# Patient Record
Sex: MALE | Race: WHITE | URBAN - METROPOLITAN AREA
[De-identification: names, ages, dates, MRNs, and addresses within clinical notes are randomized per-mention and may not be internally consistent; named-entity substitution may affect disease eponyms.]

---

## 2022-01-01 ENCOUNTER — HOSPITAL ENCOUNTER (OUTPATIENT)
Age: 72
End: 2022-06-21
Attending: INTERNAL MEDICINE | Admitting: INTERNAL MEDICINE
Payer: COMMERCIAL

## 2022-01-01 ENCOUNTER — APPOINTMENT (OUTPATIENT)
Dept: CT IMAGING | Age: 72
End: 2022-01-01
Attending: INTERNAL MEDICINE
Payer: COMMERCIAL

## 2022-01-01 ENCOUNTER — APPOINTMENT (OUTPATIENT)
Dept: GENERAL RADIOLOGY | Age: 72
End: 2022-01-01
Attending: INTERNAL MEDICINE
Payer: COMMERCIAL

## 2022-01-01 ENCOUNTER — APPOINTMENT (OUTPATIENT)
Dept: MRI IMAGING | Age: 72
End: 2022-01-01
Attending: INTERNAL MEDICINE
Payer: COMMERCIAL

## 2022-01-01 ENCOUNTER — APPOINTMENT (OUTPATIENT)
Dept: NON INVASIVE DIAGNOSTICS | Age: 72
End: 2022-01-01
Attending: INTERNAL MEDICINE
Payer: COMMERCIAL

## 2022-01-01 ENCOUNTER — HOSPITAL ENCOUNTER (EMERGENCY)
Age: 72
Discharge: HOME | End: 2022-06-14
Attending: EMERGENCY MEDICINE
Payer: COMMERCIAL

## 2022-01-01 VITALS
SYSTOLIC BLOOD PRESSURE: 124 MMHG | HEART RATE: 98 BPM | RESPIRATION RATE: 22 BRPM | DIASTOLIC BLOOD PRESSURE: 95 MMHG | OXYGEN SATURATION: 87 % | TEMPERATURE: 98 F

## 2022-01-01 VITALS
SYSTOLIC BLOOD PRESSURE: 125 MMHG | TEMPERATURE: 98.5 F | WEIGHT: 211.64 LBS | HEART RATE: 97 BPM | BODY MASS INDEX: 29.63 KG/M2 | OXYGEN SATURATION: 96 % | DIASTOLIC BLOOD PRESSURE: 79 MMHG | HEIGHT: 71 IN | RESPIRATION RATE: 17 BRPM

## 2022-01-01 DIAGNOSIS — G37.3 TRANSVERSE MYELITIS (HCC): ICD-10-CM

## 2022-01-01 DIAGNOSIS — Z92.89 HISTORY OF PLASMAPHERESIS: Primary | ICD-10-CM

## 2022-01-01 LAB
25(OH)D3 SERPL-MCNC: 19.8 NG/ML (ref 30–100)
ABO + RH BLD: NORMAL
ALBUMIN SERPL-MCNC: 2.3 G/DL (ref 3.2–4.6)
ALBUMIN SERPL-MCNC: 2.4 G/DL (ref 3.2–4.6)
ALBUMIN SERPL-MCNC: 2.5 G/DL (ref 3.2–4.6)
ALBUMIN SERPL-MCNC: 2.7 G/DL (ref 3.2–4.6)
ALBUMIN SERPL-MCNC: 2.7 G/DL (ref 3.2–4.6)
ALBUMIN SERPL-MCNC: 3.1 G/DL (ref 3.2–4.6)
ALBUMIN SERPL-MCNC: 3.3 G/DL (ref 3.2–4.6)
ALBUMIN SERPL-MCNC: 3.4 G/DL (ref 3.2–4.6)
ALBUMIN/GLOB SERPL: 0.7 {RATIO} (ref 1.2–3.5)
ALBUMIN/GLOB SERPL: 0.7 {RATIO} (ref 1.2–3.5)
ALBUMIN/GLOB SERPL: 0.8 {RATIO} (ref 1.2–3.5)
ALBUMIN/GLOB SERPL: 0.8 {RATIO} (ref 1.2–3.5)
ALBUMIN/GLOB SERPL: 0.9 {RATIO} (ref 1.2–3.5)
ALBUMIN/GLOB SERPL: 1.8 {RATIO} (ref 1.2–3.5)
ALBUMIN/GLOB SERPL: 2.1 {RATIO} (ref 1.2–3.5)
ALBUMIN/GLOB SERPL: 2.1 {RATIO} (ref 1.2–3.5)
ALBUMIN/GLOB SERPL: 2.3 {RATIO} (ref 1.2–3.5)
ALBUMIN/GLOB SERPL: 3.4 {RATIO} (ref 1.2–3.5)
ALP SERPL-CCNC: 23 U/L (ref 50–136)
ALP SERPL-CCNC: 30 U/L (ref 50–136)
ALP SERPL-CCNC: 33 U/L (ref 50–136)
ALP SERPL-CCNC: 41 U/L (ref 50–136)
ALP SERPL-CCNC: 49 U/L (ref 50–136)
ALP SERPL-CCNC: 75 U/L (ref 50–136)
ALP SERPL-CCNC: 75 U/L (ref 50–136)
ALP SERPL-CCNC: 77 U/L (ref 50–136)
ALP SERPL-CCNC: 78 U/L (ref 50–136)
ALP SERPL-CCNC: 84 U/L (ref 50–136)
ALT SERPL-CCNC: 169 U/L (ref 12–65)
ALT SERPL-CCNC: 40 U/L (ref 12–65)
ALT SERPL-CCNC: 42 U/L (ref 12–65)
ALT SERPL-CCNC: 47 U/L (ref 12–65)
ALT SERPL-CCNC: 50 U/L (ref 12–65)
ALT SERPL-CCNC: 50 U/L (ref 12–65)
ALT SERPL-CCNC: 58 U/L (ref 12–65)
ALT SERPL-CCNC: 64 U/L (ref 12–65)
ALT SERPL-CCNC: 69 U/L (ref 12–65)
ALT SERPL-CCNC: 72 U/L (ref 12–65)
AMMONIA PLAS-SCNC: 51 UMOL/L (ref 11–32)
ANION GAP SERPL CALC-SCNC: 5 MMOL/L (ref 7–16)
ANION GAP SERPL CALC-SCNC: 6 MMOL/L (ref 7–16)
ANION GAP SERPL CALC-SCNC: 7 MMOL/L (ref 7–16)
ANION GAP SERPL CALC-SCNC: 8 MMOL/L (ref 7–16)
APPEARANCE FLD: COLORLESS
APTT PPP: 25.5 SEC (ref 24.1–35.1)
APTT PPP: 26.3 SEC (ref 24.1–35.1)
APTT PPP: 27.4 SEC (ref 24.1–35.1)
APTT PPP: 27.6 SEC (ref 24.1–35.1)
APTT PPP: 31.3 SEC (ref 24.1–35.1)
APTT PPP: 35 SEC (ref 24.1–35.1)
APTT PPP: 39.2 SEC (ref 24.1–35.1)
APTT PPP: 44.6 SEC (ref 24.1–35.1)
AQP4 H2O CHANNEL IGG SERPL IA-ACNC: <1.5 U/ML (ref 0–3)
AST SERPL-CCNC: 12 U/L (ref 15–37)
AST SERPL-CCNC: 13 U/L (ref 15–37)
AST SERPL-CCNC: 16 U/L (ref 15–37)
AST SERPL-CCNC: 25 U/L (ref 15–37)
AST SERPL-CCNC: 25 U/L (ref 15–37)
AST SERPL-CCNC: 27 U/L (ref 15–37)
AST SERPL-CCNC: 29 U/L (ref 15–37)
AST SERPL-CCNC: 31 U/L (ref 15–37)
AST SERPL-CCNC: 34 U/L (ref 15–37)
AST SERPL-CCNC: 73 U/L (ref 15–37)
BACTERIA SPEC CULT: NORMAL
BASOPHILS # BLD: 0 K/UL (ref 0–0.2)
BASOPHILS NFR BLD: 0 % (ref 0–2)
BASOPHILS NFR BLD: 1 % (ref 0–2)
BILIRUB DIRECT SERPL-MCNC: 0.5 MG/DL
BILIRUB DIRECT SERPL-MCNC: 0.5 MG/DL
BILIRUB INDIRECT SERPL-MCNC: 0 MG/DL (ref 0–1.1)
BILIRUB INDIRECT SERPL-MCNC: 1.4 MG/DL (ref 0–1.1)
BILIRUB SERPL-MCNC: 0.4 MG/DL (ref 0.2–1.1)
BILIRUB SERPL-MCNC: 0.5 MG/DL (ref 0.2–1.1)
BILIRUB SERPL-MCNC: 0.7 MG/DL (ref 0.2–1.1)
BILIRUB SERPL-MCNC: 0.8 MG/DL (ref 0.2–1.1)
BILIRUB SERPL-MCNC: 0.8 MG/DL (ref 0.2–1.1)
BILIRUB SERPL-MCNC: 1.1 MG/DL (ref 0.2–1.1)
BILIRUB SERPL-MCNC: 1.2 MG/DL (ref 0.2–1.1)
BILIRUB SERPL-MCNC: 1.8 MG/DL (ref 0.2–1.1)
BILIRUB SERPL-MCNC: 1.9 MG/DL (ref 0.2–1.1)
BILIRUB SERPL-MCNC: 2 MG/DL (ref 0.2–1.1)
BLD PROD TYP BPU: NORMAL
BLOOD BANK DISPENSE STATUS: NORMAL
BLOOD GROUP ANTIBODIES SERPL: NORMAL
BPU ID: NORMAL
BUN SERPL-MCNC: 14 MG/DL (ref 8–23)
BUN SERPL-MCNC: 18 MG/DL (ref 8–23)
BUN SERPL-MCNC: 20 MG/DL (ref 8–23)
BUN SERPL-MCNC: 20 MG/DL (ref 8–23)
BUN SERPL-MCNC: 30 MG/DL (ref 8–23)
BUN SERPL-MCNC: 33 MG/DL (ref 8–23)
BUN SERPL-MCNC: 36 MG/DL (ref 8–23)
BUN SERPL-MCNC: 36 MG/DL (ref 8–23)
BUN SERPL-MCNC: 39 MG/DL (ref 8–23)
BUN SERPL-MCNC: 40 MG/DL (ref 8–23)
BUN SERPL-MCNC: 44 MG/DL (ref 8–23)
BUN SERPL-MCNC: 52 MG/DL (ref 8–23)
BUN SERPL-MCNC: 54 MG/DL (ref 8–23)
BUN SERPL-MCNC: 60 MG/DL (ref 8–23)
CA-I BLD-MCNC: 4.52 MG/DL (ref 4–5.2)
CALCIUM SERPL-MCNC: 7.5 MG/DL (ref 8.3–10.4)
CALCIUM SERPL-MCNC: 7.6 MG/DL (ref 8.3–10.4)
CALCIUM SERPL-MCNC: 7.8 MG/DL (ref 8.3–10.4)
CALCIUM SERPL-MCNC: 8 MG/DL (ref 8.3–10.4)
CALCIUM SERPL-MCNC: 8.3 MG/DL (ref 8.3–10.4)
CALCIUM SERPL-MCNC: 8.3 MG/DL (ref 8.3–10.4)
CALCIUM SERPL-MCNC: 8.4 MG/DL (ref 8.3–10.4)
CALCIUM SERPL-MCNC: 8.7 MG/DL (ref 8.3–10.4)
CALCIUM SERPL-MCNC: 8.8 MG/DL (ref 8.3–10.4)
CALCIUM SERPL-MCNC: 8.9 MG/DL (ref 8.3–10.4)
CALCIUM SERPL-MCNC: 8.9 MG/DL (ref 8.3–10.4)
CALCIUM SERPL-MCNC: 9.1 MG/DL (ref 8.3–10.4)
CALCIUM SERPL-MCNC: 9.2 MG/DL (ref 8.3–10.4)
CALCIUM SERPL-MCNC: 9.4 MG/DL (ref 8.3–10.4)
CHLORIDE SERPL-SCNC: 113 MMOL/L (ref 98–107)
CHLORIDE SERPL-SCNC: 113 MMOL/L (ref 98–107)
CHLORIDE SERPL-SCNC: 114 MMOL/L (ref 98–107)
CHLORIDE SERPL-SCNC: 115 MMOL/L (ref 98–107)
CHLORIDE SERPL-SCNC: 118 MMOL/L (ref 98–107)
CHLORIDE SERPL-SCNC: 118 MMOL/L (ref 98–107)
CHLORIDE SERPL-SCNC: 119 MMOL/L (ref 98–107)
CHLORIDE SERPL-SCNC: 120 MMOL/L (ref 98–107)
CHLORIDE SERPL-SCNC: 122 MMOL/L (ref 98–107)
CHLORIDE SERPL-SCNC: 123 MMOL/L (ref 98–107)
CO2 SERPL-SCNC: 18 MMOL/L (ref 21–32)
CO2 SERPL-SCNC: 20 MMOL/L (ref 21–32)
CO2 SERPL-SCNC: 20 MMOL/L (ref 21–32)
CO2 SERPL-SCNC: 21 MMOL/L (ref 21–32)
CO2 SERPL-SCNC: 23 MMOL/L (ref 21–32)
CO2 SERPL-SCNC: 24 MMOL/L (ref 21–32)
CO2 SERPL-SCNC: 25 MMOL/L (ref 21–32)
CO2 SERPL-SCNC: 26 MMOL/L (ref 21–32)
CO2 SERPL-SCNC: 27 MMOL/L (ref 21–32)
CO2 SERPL-SCNC: 27 MMOL/L (ref 21–32)
CO2 SERPL-SCNC: 28 MMOL/L (ref 21–32)
COLOR FLD: CLEAR
CORTIS BS SERPL-MCNC: 17.9 UG/DL
CREAT SERPL-MCNC: 0.4 MG/DL (ref 0.8–1.5)
CREAT SERPL-MCNC: 0.5 MG/DL (ref 0.8–1.5)
CYTOLOGY-NON GYN: NORMAL
DIFFERENTIAL METHOD BLD: ABNORMAL
ECHO AO ASC DIAM: 3.7 CM
ECHO AO ASCENDING AORTA INDEX: 1.71 CM/M2
ECHO AO ROOT DIAM: 3.5 CM
ECHO AO ROOT INDEX: 1.62 CM/M2
ECHO AV AREA PEAK VELOCITY: 3.2 CM2
ECHO AV AREA VTI: 3.4 CM2
ECHO AV AREA/BSA PEAK VELOCITY: 1.5 CM2/M2
ECHO AV AREA/BSA VTI: 1.6 CM2/M2
ECHO AV MEAN GRADIENT: 3 MMHG
ECHO AV MEAN VELOCITY: 0.8 M/S
ECHO AV PEAK GRADIENT: 6 MMHG
ECHO AV PEAK VELOCITY: 1.3 M/S
ECHO AV VELOCITY RATIO: 0.85
ECHO AV VTI: 22.1 CM
ECHO BSA: 2.19 M2
ECHO EST RA PRESSURE: 8 MMHG
ECHO IVC PROX: 2.3 CM
ECHO LA AREA 2C: 17.4 CM2
ECHO LA AREA 4C: 18.2 CM2
ECHO LA DIAMETER INDEX: 1.81 CM/M2
ECHO LA DIAMETER: 3.9 CM
ECHO LA MAJOR AXIS: 5.2 CM
ECHO LA MINOR AXIS: 5.1 CM
ECHO LA TO AORTIC ROOT RATIO: 1.11
ECHO LA VOL BP: 45 ML (ref 18–58)
ECHO LA VOL/BSA BIPLANE: 21 ML/M2 (ref 16–34)
ECHO LV E' LATERAL VELOCITY: 9 CM/S
ECHO LV E' SEPTAL VELOCITY: 7 CM/S
ECHO LV EDV A2C: 125 ML
ECHO LV EDV A4C: 147 ML
ECHO LV EDV INDEX A4C: 68 ML/M2
ECHO LV EDV NDEX A2C: 58 ML/M2
ECHO LV EJECTION FRACTION A2C: 55 %
ECHO LV EJECTION FRACTION A4C: 53 %
ECHO LV EJECTION FRACTION BIPLANE: 54 % (ref 55–100)
ECHO LV ESV A2C: 57 ML
ECHO LV ESV A4C: 69 ML
ECHO LV ESV INDEX A2C: 26 ML/M2
ECHO LV ESV INDEX A4C: 32 ML/M2
ECHO LV FRACTIONAL SHORTENING: 27 % (ref 28–44)
ECHO LV INTERNAL DIMENSION DIASTOLE INDEX: 2.04 CM/M2
ECHO LV INTERNAL DIMENSION DIASTOLIC: 4.4 CM (ref 4.2–5.9)
ECHO LV INTERNAL DIMENSION SYSTOLIC INDEX: 1.48 CM/M2
ECHO LV INTERNAL DIMENSION SYSTOLIC: 3.2 CM
ECHO LV IVSD: 1 CM (ref 0.6–1)
ECHO LV MASS 2D: 147.8 G (ref 88–224)
ECHO LV MASS INDEX 2D: 68.4 G/M2 (ref 49–115)
ECHO LV POSTERIOR WALL DIASTOLIC: 1 CM (ref 0.6–1)
ECHO LV RELATIVE WALL THICKNESS RATIO: 0.45
ECHO LVOT AREA: 3.8 CM2
ECHO LVOT AV VTI INDEX: 0.88
ECHO LVOT DIAM: 2.2 CM
ECHO LVOT MEAN GRADIENT: 2 MMHG
ECHO LVOT PEAK GRADIENT: 4 MMHG
ECHO LVOT PEAK VELOCITY: 1.1 M/S
ECHO LVOT STROKE VOLUME INDEX: 34.3 ML/M2
ECHO LVOT SV: 74.1 ML
ECHO LVOT VTI: 19.5 CM
ECHO MV A VELOCITY: 0.86 M/S
ECHO MV E DECELERATION TIME (DT): 210 MS
ECHO MV E VELOCITY: 0.7 M/S
ECHO MV E/A RATIO: 0.81
ECHO MV E/E' LATERAL: 7.78
ECHO MV E/E' RATIO (AVERAGED): 8.89
ECHO MV E/E' SEPTAL: 10
ECHO RIGHT VENTRICULAR SYSTOLIC PRESSURE (RVSP): 32 MMHG
ECHO RV BASAL DIMENSION: 4.1 CM
ECHO RV TAPSE: 1.8 CM (ref 1.7–?)
ECHO TV REGURGITANT MAX VELOCITY: 2.43 M/S
ECHO TV REGURGITANT PEAK GRADIENT: 24 MMHG
EOSINOPHIL # BLD: 0 K/UL (ref 0–0.8)
EOSINOPHIL # BLD: 0.1 K/UL (ref 0–0.8)
EOSINOPHIL NFR BLD: 0 % (ref 0.5–7.8)
EOSINOPHIL NFR BLD: 2 % (ref 0.5–7.8)
EOSINOPHIL NFR BLD: 3 % (ref 0.5–7.8)
EOSINOPHIL NFR BLD: 4 % (ref 0.5–7.8)
ERYTHROCYTE [DISTWIDTH] IN BLOOD BY AUTOMATED COUNT: 15.3 % (ref 11.9–14.6)
ERYTHROCYTE [DISTWIDTH] IN BLOOD BY AUTOMATED COUNT: 15.5 % (ref 11.9–14.6)
ERYTHROCYTE [DISTWIDTH] IN BLOOD BY AUTOMATED COUNT: 15.8 % (ref 11.9–14.6)
ERYTHROCYTE [DISTWIDTH] IN BLOOD BY AUTOMATED COUNT: 15.8 % (ref 11.9–14.6)
ERYTHROCYTE [DISTWIDTH] IN BLOOD BY AUTOMATED COUNT: 15.9 % (ref 11.9–14.6)
ERYTHROCYTE [DISTWIDTH] IN BLOOD BY AUTOMATED COUNT: 16 % (ref 11.9–14.6)
ERYTHROCYTE [DISTWIDTH] IN BLOOD BY AUTOMATED COUNT: 16.2 % (ref 11.9–14.6)
ERYTHROCYTE [DISTWIDTH] IN BLOOD BY AUTOMATED COUNT: 16.5 % (ref 11.9–14.6)
ERYTHROCYTE [DISTWIDTH] IN BLOOD BY AUTOMATED COUNT: 16.9 % (ref 11.9–14.6)
ERYTHROCYTE [DISTWIDTH] IN BLOOD BY AUTOMATED COUNT: 17.6 % (ref 11.9–14.6)
ERYTHROCYTE [SEDIMENTATION RATE] IN BLOOD: 26 MM/HR
FERRITIN SERPL-MCNC: 673 NG/ML (ref 8–388)
FERRITIN SERPL-MCNC: 685 NG/ML (ref 8–388)
FIBRINOGEN PPP-MCNC: 135 MG/DL (ref 190–501)
FIBRINOGEN PPP-MCNC: 136 MG/DL (ref 190–501)
FIBRINOGEN PPP-MCNC: 145 MG/DL (ref 190–501)
FIBRINOGEN PPP-MCNC: 172 MG/DL (ref 190–501)
FIBRINOGEN PPP-MCNC: 344 MG/DL (ref 190–501)
FIBRINOGEN PPP-MCNC: 395 MG/DL (ref 190–501)
FIBRINOGEN PPP-MCNC: 62 MG/DL (ref 190–501)
FIBRINOGEN PPP-MCNC: 87 MG/DL (ref 190–501)
FLOW CYTOMETRY RESULTS: NORMAL
FOLATE SERPL-MCNC: 3.5 NG/ML (ref 3.1–17.5)
FOLATE SERPL-MCNC: 3.5 NG/ML (ref 3.1–17.5)
FOLATE SERPL-MCNC: 6.8 NG/ML (ref 3.1–17.5)
GLOBULIN SER CALC-MCNC: 1 G/DL (ref 2.3–3.5)
GLOBULIN SER CALC-MCNC: 1.2 G/DL (ref 2.3–3.5)
GLOBULIN SER CALC-MCNC: 1.3 G/DL (ref 2.3–3.5)
GLOBULIN SER CALC-MCNC: 1.6 G/DL (ref 2.3–3.5)
GLOBULIN SER CALC-MCNC: 1.7 G/DL (ref 2.3–3.5)
GLOBULIN SER CALC-MCNC: 2.8 G/DL (ref 2.3–3.5)
GLOBULIN SER CALC-MCNC: 3.1 G/DL (ref 2.3–3.5)
GLOBULIN SER CALC-MCNC: 3.2 G/DL (ref 2.3–3.5)
GLOBULIN SER CALC-MCNC: 3.3 G/DL (ref 2.3–3.5)
GLOBULIN SER CALC-MCNC: 3.3 G/DL (ref 2.3–3.5)
GLUCOSE CSF-MCNC: 34 MG/DL (ref 40–70)
GLUCOSE SERPL-MCNC: 107 MG/DL (ref 65–100)
GLUCOSE SERPL-MCNC: 140 MG/DL (ref 65–100)
GLUCOSE SERPL-MCNC: 141 MG/DL (ref 65–100)
GLUCOSE SERPL-MCNC: 142 MG/DL (ref 65–100)
GLUCOSE SERPL-MCNC: 150 MG/DL (ref 65–100)
GLUCOSE SERPL-MCNC: 152 MG/DL (ref 65–100)
GLUCOSE SERPL-MCNC: 157 MG/DL (ref 65–100)
GLUCOSE SERPL-MCNC: 158 MG/DL (ref 65–100)
GLUCOSE SERPL-MCNC: 160 MG/DL (ref 65–100)
GLUCOSE SERPL-MCNC: 160 MG/DL (ref 65–100)
GLUCOSE SERPL-MCNC: 166 MG/DL (ref 65–100)
GLUCOSE SERPL-MCNC: 88 MG/DL (ref 65–100)
GLUCOSE SERPL-MCNC: 91 MG/DL (ref 65–100)
GLUCOSE SERPL-MCNC: 91 MG/DL (ref 65–100)
GRAM STN SPEC: NORMAL
GRAM STN SPEC: NORMAL
HAPTOGLOB SERPL-MCNC: 127 MG/DL (ref 30–200)
HCT VFR BLD AUTO: 27.1 % (ref 41.1–50.3)
HCT VFR BLD AUTO: 30.8 % (ref 41.1–50.3)
HCT VFR BLD AUTO: 31.5 % (ref 41.1–50.3)
HCT VFR BLD AUTO: 32 % (ref 41.1–50.3)
HCT VFR BLD AUTO: 33.1 % (ref 41.1–50.3)
HCT VFR BLD AUTO: 33.9 % (ref 41.1–50.3)
HCT VFR BLD AUTO: 34.2 % (ref 41.1–50.3)
HCT VFR BLD AUTO: 34.3 % (ref 41.1–50.3)
HCT VFR BLD AUTO: 34.3 % (ref 41.1–50.3)
HCT VFR BLD AUTO: 34.4 % (ref 41.1–50.3)
HCT VFR BLD AUTO: 34.8 % (ref 41.1–50.3)
HCT VFR BLD AUTO: 35.7 % (ref 41.1–50.3)
HGB BLD-MCNC: 10.4 G/DL (ref 13.6–17.2)
HGB BLD-MCNC: 10.6 G/DL (ref 13.6–17.2)
HGB BLD-MCNC: 10.9 G/DL (ref 13.6–17.2)
HGB BLD-MCNC: 11.1 G/DL (ref 13.6–17.2)
HGB BLD-MCNC: 11.2 G/DL (ref 13.6–17.2)
HGB BLD-MCNC: 11.3 G/DL (ref 13.6–17.2)
HGB BLD-MCNC: 11.3 G/DL (ref 13.6–17.2)
HGB BLD-MCNC: 11.4 G/DL (ref 13.6–17.2)
HGB BLD-MCNC: 11.5 G/DL (ref 13.6–17.2)
HGB BLD-MCNC: 11.8 G/DL (ref 13.6–17.2)
HGB BLD-MCNC: 9 G/DL (ref 13.6–17.2)
HGB BLD-MCNC: 9.7 G/DL (ref 13.6–17.2)
HGB RETIC QN AUTO: 41 PG (ref 29–35)
HIV1 RNA # SERPL NAA+PROBE: <20 COPIES/ML
HIV1 RNA SERPL NAA+PROBE-LOG#: NORMAL LOG10COPY/ML
IMM GRANULOCYTES # BLD AUTO: 0 K/UL (ref 0–0.5)
IMM GRANULOCYTES # BLD AUTO: 0.1 K/UL (ref 0–0.5)
IMM GRANULOCYTES # BLD AUTO: 0.2 K/UL (ref 0–0.5)
IMM GRANULOCYTES NFR BLD AUTO: 0 % (ref 0–5)
IMM GRANULOCYTES NFR BLD AUTO: 1 % (ref 0–5)
IMM RETICS NFR: 17.2 % (ref 2.3–13.4)
INR PPP: 1.6
INTERPRETATION: NORMAL
IRON SATN MFR SERPL: 11 %
IRON SATN MFR SERPL: 20 %
IRON SERPL-MCNC: 13 UG/DL (ref 35–150)
IRON SERPL-MCNC: 8 UG/DL (ref 35–150)
LACTATE SERPL-SCNC: 0.6 MMOL/L (ref 0.4–2)
LDH SERPL L TO P-CCNC: 227 U/L (ref 110–210)
LDH SERPL L TO P-CCNC: 230 U/L (ref 110–210)
LYMPHOCYTES # BLD: 0.4 K/UL (ref 0.5–4.6)
LYMPHOCYTES # BLD: 0.5 K/UL (ref 0.5–4.6)
LYMPHOCYTES # BLD: 0.6 K/UL (ref 0.5–4.6)
LYMPHOCYTES # BLD: 0.6 K/UL (ref 0.5–4.6)
LYMPHOCYTES # BLD: 0.7 K/UL (ref 0.5–4.6)
LYMPHOCYTES # BLD: 0.7 K/UL (ref 0.5–4.6)
LYMPHOCYTES # BLD: 1.2 K/UL (ref 0.5–4.6)
LYMPHOCYTES # BLD: 1.2 K/UL (ref 0.5–4.6)
LYMPHOCYTES NFR BLD: 10 % (ref 13–44)
LYMPHOCYTES NFR BLD: 11 % (ref 13–44)
LYMPHOCYTES NFR BLD: 12 % (ref 13–44)
LYMPHOCYTES NFR BLD: 16 % (ref 13–44)
LYMPHOCYTES NFR BLD: 22 % (ref 13–44)
LYMPHOCYTES NFR BLD: 26 % (ref 13–44)
LYMPHOCYTES NFR BLD: 3 % (ref 13–44)
LYMPHOCYTES NFR BLD: 3 % (ref 13–44)
LYMPHOCYTES NFR BLD: 32 % (ref 13–44)
LYMPHOCYTES NFR BLD: 7 % (ref 13–44)
Lab: NORMAL
Lab: NORMAL
MAGNESIUM SERPL-MCNC: 2 MG/DL (ref 1.8–2.4)
MAGNESIUM SERPL-MCNC: 2 MG/DL (ref 1.8–2.4)
MAGNESIUM SERPL-MCNC: 2.1 MG/DL (ref 1.8–2.4)
MAGNESIUM SERPL-MCNC: 2.2 MG/DL (ref 1.8–2.4)
MAGNESIUM SERPL-MCNC: 2.2 MG/DL (ref 1.8–2.4)
MAGNESIUM SERPL-MCNC: 2.4 MG/DL (ref 1.8–2.4)
MCH RBC QN AUTO: 29.1 PG (ref 26.1–32.9)
MCH RBC QN AUTO: 29.1 PG (ref 26.1–32.9)
MCH RBC QN AUTO: 29.3 PG (ref 26.1–32.9)
MCH RBC QN AUTO: 29.6 PG (ref 26.1–32.9)
MCH RBC QN AUTO: 29.7 PG (ref 26.1–32.9)
MCH RBC QN AUTO: 29.7 PG (ref 26.1–32.9)
MCH RBC QN AUTO: 29.8 PG (ref 26.1–32.9)
MCH RBC QN AUTO: 29.9 PG (ref 26.1–32.9)
MCH RBC QN AUTO: 29.9 PG (ref 26.1–32.9)
MCH RBC QN AUTO: 30 PG (ref 26.1–32.9)
MCHC RBC AUTO-ENTMCNC: 31.5 G/DL (ref 31.4–35)
MCHC RBC AUTO-ENTMCNC: 32.5 G/DL (ref 31.4–35)
MCHC RBC AUTO-ENTMCNC: 32.6 G/DL (ref 31.4–35)
MCHC RBC AUTO-ENTMCNC: 32.9 G/DL (ref 31.4–35)
MCHC RBC AUTO-ENTMCNC: 32.9 G/DL (ref 31.4–35)
MCHC RBC AUTO-ENTMCNC: 33 G/DL (ref 31.4–35)
MCHC RBC AUTO-ENTMCNC: 33 G/DL (ref 31.4–35)
MCHC RBC AUTO-ENTMCNC: 33.1 G/DL (ref 31.4–35)
MCHC RBC AUTO-ENTMCNC: 33.2 G/DL (ref 31.4–35)
MCHC RBC AUTO-ENTMCNC: 33.6 G/DL (ref 31.4–35)
MCV RBC AUTO: 88.1 FL (ref 79.6–97.8)
MCV RBC AUTO: 89 FL (ref 79.6–97.8)
MCV RBC AUTO: 89.5 FL (ref 79.6–97.8)
MCV RBC AUTO: 89.8 FL (ref 79.6–97.8)
MCV RBC AUTO: 90 FL (ref 79.6–97.8)
MCV RBC AUTO: 90 FL (ref 79.6–97.8)
MCV RBC AUTO: 90.1 FL (ref 79.6–97.8)
MCV RBC AUTO: 90.3 FL (ref 79.6–97.8)
MCV RBC AUTO: 90.4 FL (ref 79.6–97.8)
MCV RBC AUTO: 94.5 FL (ref 79.6–97.8)
MONOCYTES # BLD: 0.2 K/UL (ref 0.1–1.3)
MONOCYTES # BLD: 0.2 K/UL (ref 0.1–1.3)
MONOCYTES # BLD: 0.3 K/UL (ref 0.1–1.3)
MONOCYTES # BLD: 0.4 K/UL (ref 0.1–1.3)
MONOCYTES # BLD: 0.4 K/UL (ref 0.1–1.3)
MONOCYTES # BLD: 0.5 K/UL (ref 0.1–1.3)
MONOCYTES # BLD: 0.5 K/UL (ref 0.1–1.3)
MONOCYTES # BLD: 0.6 K/UL (ref 0.1–1.3)
MONOCYTES # BLD: 0.7 K/UL (ref 0.1–1.3)
MONOCYTES # BLD: 0.7 K/UL (ref 0.1–1.3)
MONOCYTES NFR BLD: 10 % (ref 4–12)
MONOCYTES NFR BLD: 11 % (ref 4–12)
MONOCYTES NFR BLD: 4 % (ref 4–12)
MONOCYTES NFR BLD: 4 % (ref 4–12)
MONOCYTES NFR BLD: 5 % (ref 4–12)
MONOCYTES NFR BLD: 5 % (ref 4–12)
MONOCYTES NFR BLD: 7 % (ref 4–12)
MONOCYTES NFR BLD: 8 % (ref 4–12)
MONOCYTES NFR BLD: 8 % (ref 4–12)
MONOCYTES NFR BLD: 9 % (ref 4–12)
NEUTS SEG # BLD: 1.7 K/UL (ref 1.7–8.2)
NEUTS SEG # BLD: 12.8 K/UL (ref 1.7–8.2)
NEUTS SEG # BLD: 15.5 K/UL (ref 1.7–8.2)
NEUTS SEG # BLD: 2 K/UL (ref 1.7–8.2)
NEUTS SEG # BLD: 3.3 K/UL (ref 1.7–8.2)
NEUTS SEG # BLD: 3.7 K/UL (ref 1.7–8.2)
NEUTS SEG # BLD: 4.1 K/UL (ref 1.7–8.2)
NEUTS SEG # BLD: 4.1 K/UL (ref 1.7–8.2)
NEUTS SEG # BLD: 5 K/UL (ref 1.7–8.2)
NEUTS SEG # BLD: 6.4 K/UL (ref 1.7–8.2)
NEUTS SEG NFR BLD: 54 % (ref 43–78)
NEUTS SEG NFR BLD: 59 % (ref 43–78)
NEUTS SEG NFR BLD: 66 % (ref 43–78)
NEUTS SEG NFR BLD: 80 % (ref 43–78)
NEUTS SEG NFR BLD: 84 % (ref 43–78)
NEUTS SEG NFR BLD: 84 % (ref 43–78)
NEUTS SEG NFR BLD: 91 % (ref 43–78)
NEUTS SEG NFR BLD: 92 % (ref 43–78)
NRBC # BLD: 0 K/UL (ref 0–0.2)
NRBC # BLD: 0.02 K/UL (ref 0–0.2)
NRBC # BLD: 0.02 K/UL (ref 0–0.2)
NRBC # BLD: 0.03 K/UL (ref 0–0.2)
NUC CELL # FLD: 5 /CU MM
OLIGOCLONAL BANDS.IT SER+CSF QL: NORMAL
PHOSPHATE SERPL-MCNC: 2 MG/DL (ref 2.3–3.7)
PHOSPHATE SERPL-MCNC: 2.1 MG/DL (ref 2.3–3.7)
PHOSPHATE SERPL-MCNC: 2.1 MG/DL (ref 2.3–3.7)
PHOSPHATE SERPL-MCNC: 2.2 MG/DL (ref 2.3–3.7)
PHOSPHATE SERPL-MCNC: 3.2 MG/DL (ref 2.3–3.7)
PLATELET # BLD AUTO: 130 K/UL (ref 150–450)
PLATELET # BLD AUTO: 153 K/UL (ref 150–450)
PLATELET # BLD AUTO: 170 K/UL (ref 150–450)
PLATELET # BLD AUTO: 178 K/UL (ref 150–450)
PLATELET # BLD AUTO: 206 K/UL (ref 150–450)
PLATELET # BLD AUTO: 210 K/UL (ref 150–450)
PLATELET # BLD AUTO: 216 K/UL (ref 150–450)
PLATELET # BLD AUTO: 225 K/UL (ref 150–450)
PLATELET # BLD AUTO: 247 K/UL (ref 150–450)
PLATELET # BLD AUTO: 74 K/UL (ref 150–450)
PLATELETS.RETICULATED NFR BLD AUTO: 6 % (ref 1.8–7.9)
PMV BLD AUTO: 10 FL (ref 9.4–12.3)
PMV BLD AUTO: 10.2 FL (ref 9.4–12.3)
PMV BLD AUTO: 10.3 FL (ref 9.4–12.3)
PMV BLD AUTO: 10.5 FL (ref 9.4–12.3)
PMV BLD AUTO: 10.7 FL (ref 9.4–12.3)
PMV BLD AUTO: 10.8 FL (ref 9.4–12.3)
PMV BLD AUTO: 11 FL (ref 9.4–12.3)
PMV BLD AUTO: 11 FL (ref 9.4–12.3)
PMV BLD AUTO: 11.2 FL (ref 9.4–12.3)
PMV BLD AUTO: 11.6 FL (ref 9.4–12.3)
POTASSIUM SERPL-SCNC: 3 MMOL/L (ref 3.5–5.1)
POTASSIUM SERPL-SCNC: 3.1 MMOL/L (ref 3.5–5.1)
POTASSIUM SERPL-SCNC: 3.2 MMOL/L (ref 3.5–5.1)
POTASSIUM SERPL-SCNC: 3.2 MMOL/L (ref 3.5–5.1)
POTASSIUM SERPL-SCNC: 3.4 MMOL/L (ref 3.5–5.1)
POTASSIUM SERPL-SCNC: 3.5 MMOL/L (ref 3.5–5.1)
POTASSIUM SERPL-SCNC: 3.6 MMOL/L (ref 3.5–5.1)
POTASSIUM SERPL-SCNC: 3.6 MMOL/L (ref 3.5–5.1)
POTASSIUM SERPL-SCNC: 3.7 MMOL/L (ref 3.5–5.1)
POTASSIUM SERPL-SCNC: 3.8 MMOL/L (ref 3.5–5.1)
POTASSIUM SERPL-SCNC: 3.9 MMOL/L (ref 3.5–5.1)
POTASSIUM SERPL-SCNC: 4 MMOL/L (ref 3.5–5.1)
POTASSIUM SERPL-SCNC: 4 MMOL/L (ref 3.5–5.1)
POTASSIUM SERPL-SCNC: 4.1 MMOL/L (ref 3.5–5.1)
POTASSIUM SERPL-SCNC: 4.1 MMOL/L (ref 3.5–5.1)
POTASSIUM SERPL-SCNC: 4.2 MMOL/L (ref 3.5–5.1)
PROCALCITONIN SERPL-MCNC: <0.05 NG/ML (ref 0–0.49)
PROT CSF-MCNC: 332 MG/DL (ref 15–45)
PROT SERPL-MCNC: 3.9 G/DL (ref 6.3–8.2)
PROT SERPL-MCNC: 4 G/DL (ref 6.3–8.2)
PROT SERPL-MCNC: 4.4 G/DL (ref 6.3–8.2)
PROT SERPL-MCNC: 4.8 G/DL (ref 6.3–8.2)
PROT SERPL-MCNC: 4.9 G/DL (ref 6.3–8.2)
PROT SERPL-MCNC: 5.2 G/DL (ref 6.3–8.2)
PROT SERPL-MCNC: 5.4 G/DL (ref 6.3–8.2)
PROT SERPL-MCNC: 5.6 G/DL (ref 6.3–8.2)
PROT SERPL-MCNC: 5.7 G/DL (ref 6.3–8.2)
PROT SERPL-MCNC: 5.8 G/DL (ref 6.3–8.2)
PROTHROMBIN TIME: 19.5 SEC (ref 12.6–14.5)
RBC # BLD AUTO: 3 M/UL (ref 4.23–5.6)
RBC # BLD AUTO: 3.26 M/UL (ref 4.23–5.6)
RBC # BLD AUTO: 3.5 M/UL (ref 4.23–5.6)
RBC # BLD AUTO: 3.81 M/UL (ref 4.23–5.6)
RBC # BLD AUTO: 3.81 M/UL (ref 4.23–5.6)
RBC # BLD AUTO: 3.82 M/UL (ref 4.23–5.6)
RBC # BLD AUTO: 3.85 M/UL (ref 4.23–5.6)
RBC # BLD AUTO: 4.05 M/UL (ref 4.23–5.6)
RBC # FLD: 9 /CU MM
REAGIN AB CSF QL: NON REACTIVE
REFERENCE LAB: NORMAL
RETICS # AUTO: 0.11 M/UL (ref 0.03–0.1)
RETICS/RBC NFR AUTO: 3.8 % (ref 0.3–2)
RPR SER QL: NONREACTIVE
SERVICE CMNT-IMP: NORMAL
SODIUM SERPL-SCNC: 146 MMOL/L (ref 136–145)
SODIUM SERPL-SCNC: 146 MMOL/L (ref 138–145)
SODIUM SERPL-SCNC: 147 MMOL/L (ref 136–145)
SODIUM SERPL-SCNC: 147 MMOL/L (ref 138–145)
SODIUM SERPL-SCNC: 147 MMOL/L (ref 138–145)
SODIUM SERPL-SCNC: 148 MMOL/L (ref 138–145)
SODIUM SERPL-SCNC: 148 MMOL/L (ref 138–145)
SODIUM SERPL-SCNC: 149 MMOL/L (ref 136–145)
SODIUM SERPL-SCNC: 149 MMOL/L (ref 138–145)
SODIUM SERPL-SCNC: 150 MMOL/L (ref 138–145)
SPECIMEN EXP DATE BLD: NORMAL
SPECIMEN SOURCE FLD: NORMAL
SPECIMEN SOURCE: NORMAL
SPECIMEN SOURCE: NORMAL
T PALLIDUM AB SER QL IF: NON REACTIVE
T4 SERPL-MCNC: 9 UG/DL (ref 4.8–13.9)
TEST ORDERED: NORMAL
TIBC SERPL-MCNC: 66 UG/DL (ref 250–450)
TIBC SERPL-MCNC: 70 UG/DL (ref 250–450)
TROPONIN I SERPL HS-MCNC: 11.4 PG/ML (ref 0–14)
TROPONIN I SERPL HS-MCNC: 13.6 PG/ML (ref 0–14)
TROPONIN I SERPL HS-MCNC: 16.4 PG/ML (ref 0–14)
TROPONIN I SERPL HS-MCNC: 7.1 PG/ML (ref 0–14)
TROPONIN I SERPL HS-MCNC: 7.2 PG/ML (ref 0–14)
TROPONIN I SERPL HS-MCNC: 7.7 PG/ML (ref 0–14)
TROPONIN I SERPL HS-MCNC: 7.7 PG/ML (ref 0–14)
TROPONIN I SERPL HS-MCNC: 7.8 PG/ML (ref 0–14)
TROPONIN I SERPL HS-MCNC: 8.2 PG/ML (ref 0–14)
TROPONIN I SERPL HS-MCNC: 8.6 PG/ML (ref 0–14)
TSH, 3RD GENERATION: 1.45 UIU/ML (ref 0.36–3.74)
TSH, 3RD GENERATION: 1.79 UIU/ML (ref 0.36–3.74)
TUBE # CSF: 1
TUBE # CSF: 1
UNIT DIVISION: 0
VIT B1 BLD-SCNC: 233 NMOL/L (ref 66.5–200)
VIT B12 SERPL-MCNC: 1079 PG/ML (ref 193–986)
VIT B12 SERPL-MCNC: 1652 PG/ML (ref 193–986)
VIT B12 SERPL-MCNC: 902 PG/ML (ref 193–986)
WBC # BLD AUTO: 14 K/UL (ref 4.3–11.1)
WBC # BLD AUTO: 16.8 K/UL (ref 4.3–11.1)
WBC # BLD AUTO: 2.9 K/UL (ref 4.3–11.1)
WBC # BLD AUTO: 3.7 K/UL (ref 4.3–11.1)
WBC # BLD AUTO: 4.1 K/UL (ref 4.3–11.1)
WBC # BLD AUTO: 4.9 K/UL (ref 4.3–11.1)
WBC # BLD AUTO: 5.1 K/UL (ref 4.3–11.1)
WBC # BLD AUTO: 5.5 K/UL (ref 4.3–11.1)
WBC # BLD AUTO: 6.2 K/UL (ref 4.3–11.1)
WBC # BLD AUTO: 7.6 K/UL (ref 4.3–11.1)
WNV IGG SER QL IA: NEGATIVE
WNV IGM SER QL IA: NEGATIVE

## 2022-01-01 PROCEDURE — 85730 THROMBOPLASTIN TIME PARTIAL: CPT

## 2022-01-01 PROCEDURE — 85025 COMPLETE CBC W/AUTO DIFF WBC: CPT

## 2022-01-01 PROCEDURE — P9041 ALBUMIN (HUMAN),5%, 50ML: HCPCS | Performed by: INTERNAL MEDICINE

## 2022-01-01 PROCEDURE — 80053 COMPREHEN METABOLIC PANEL: CPT

## 2022-01-01 PROCEDURE — 2580000003 HC RX 258: Performed by: INTERNAL MEDICINE

## 2022-01-01 PROCEDURE — 86363 MOG-IGG1 ANTB FLO CYTMTRY EA: CPT

## 2022-01-01 PROCEDURE — 86787 VARICELLA-ZOSTER ANTIBODY: CPT

## 2022-01-01 PROCEDURE — 85384 FIBRINOGEN ACTIVITY: CPT

## 2022-01-01 PROCEDURE — 88112 CYTOPATH CELL ENHANCE TECH: CPT

## 2022-01-01 PROCEDURE — 70450 CT HEAD/BRAIN W/O DYE: CPT

## 2022-01-01 PROCEDURE — 99284 EMERGENCY DEPT VISIT MOD MDM: CPT | Performed by: PSYCHIATRY & NEUROLOGY

## 2022-01-01 PROCEDURE — 74018 RADEX ABDOMEN 1 VIEW: CPT

## 2022-01-01 PROCEDURE — 82746 ASSAY OF FOLIC ACID SERUM: CPT

## 2022-01-01 PROCEDURE — 83916 OLIGOCLONAL BANDS: CPT

## 2022-01-01 PROCEDURE — 82140 ASSAY OF AMMONIA: CPT

## 2022-01-01 PROCEDURE — 86015 ACTIN ANTIBODY EACH: CPT

## 2022-01-01 PROCEDURE — 72146 MRI CHEST SPINE W/O DYE: CPT

## 2022-01-01 PROCEDURE — 80048 BASIC METABOLIC PNL TOTAL CA: CPT

## 2022-01-01 PROCEDURE — 87206 SMEAR FLUORESCENT/ACID STAI: CPT

## 2022-01-01 PROCEDURE — 36415 COLL VENOUS BLD VENIPUNCTURE: CPT

## 2022-01-01 PROCEDURE — 83735 ASSAY OF MAGNESIUM: CPT

## 2022-01-01 PROCEDURE — 84436 ASSAY OF TOTAL THYROXINE: CPT

## 2022-01-01 PROCEDURE — 6360000002 HC RX W HCPCS: Performed by: INTERNAL MEDICINE

## 2022-01-01 PROCEDURE — 6360000004 HC RX CONTRAST MEDICATION: Performed by: INTERNAL MEDICINE

## 2022-01-01 PROCEDURE — 84443 ASSAY THYROID STIM HORMONE: CPT

## 2022-01-01 PROCEDURE — 86480 TB TEST CELL IMMUN MEASURE: CPT

## 2022-01-01 PROCEDURE — 82945 GLUCOSE OTHER FLUID: CPT

## 2022-01-01 PROCEDURE — 82728 ASSAY OF FERRITIN: CPT

## 2022-01-01 PROCEDURE — 82607 VITAMIN B-12: CPT

## 2022-01-01 PROCEDURE — 6360000002 HC RX W HCPCS: Performed by: NURSE PRACTITIONER

## 2022-01-01 PROCEDURE — 71045 X-RAY EXAM CHEST 1 VIEW: CPT

## 2022-01-01 PROCEDURE — 84100 ASSAY OF PHOSPHORUS: CPT

## 2022-01-01 PROCEDURE — A9579 GAD-BASE MR CONTRAST NOS,1ML: HCPCS | Performed by: INTERNAL MEDICINE

## 2022-01-01 PROCEDURE — 85014 HEMATOCRIT: CPT

## 2022-01-01 PROCEDURE — 86695 HERPES SIMPLEX TYPE 1 TEST: CPT

## 2022-01-01 PROCEDURE — 99232 SBSQ HOSP IP/OBS MODERATE 35: CPT | Performed by: INTERNAL MEDICINE

## 2022-01-01 PROCEDURE — 86789 WEST NILE VIRUS ANTIBODY: CPT

## 2022-01-01 PROCEDURE — 2580000003 HC RX 258: Performed by: NURSE PRACTITIONER

## 2022-01-01 PROCEDURE — 86592 SYPHILIS TEST NON-TREP QUAL: CPT

## 2022-01-01 PROCEDURE — 99231 SBSQ HOSP IP/OBS SF/LOW 25: CPT | Performed by: INTERNAL MEDICINE

## 2022-01-01 PROCEDURE — 84425 ASSAY OF VITAMIN B-1: CPT

## 2022-01-01 PROCEDURE — 36514 APHERESIS PLASMA: CPT

## 2022-01-01 PROCEDURE — 87070 CULTURE OTHR SPECIMN AEROBIC: CPT

## 2022-01-01 PROCEDURE — 93306 TTE W/DOPPLER COMPLETE: CPT | Performed by: INTERNAL MEDICINE

## 2022-01-01 PROCEDURE — 82330 ASSAY OF CALCIUM: CPT

## 2022-01-01 PROCEDURE — 83615 LACTATE (LD) (LDH) ENZYME: CPT

## 2022-01-01 PROCEDURE — 99253 IP/OBS CNSLTJ NEW/EST LOW 45: CPT | Performed by: INTERNAL MEDICINE

## 2022-01-01 PROCEDURE — 84484 ASSAY OF TROPONIN QUANT: CPT

## 2022-01-01 PROCEDURE — 86901 BLOOD TYPING SEROLOGIC RH(D): CPT

## 2022-01-01 PROCEDURE — 86053 AQAPRN-4 ANTB FLO CYTMTRY EA: CPT

## 2022-01-01 PROCEDURE — 85652 RBC SED RATE AUTOMATED: CPT

## 2022-01-01 PROCEDURE — 85046 RETICYTE/HGB CONCENTRATE: CPT

## 2022-01-01 PROCEDURE — 93306 TTE W/DOPPLER COMPLETE: CPT

## 2022-01-01 PROCEDURE — 86780 TREPONEMA PALLIDUM: CPT

## 2022-01-01 PROCEDURE — 84132 ASSAY OF SERUM POTASSIUM: CPT

## 2022-01-01 PROCEDURE — 86618 LYME DISEASE ANTIBODY: CPT

## 2022-01-01 PROCEDURE — 89050 BODY FLUID CELL COUNT: CPT

## 2022-01-01 PROCEDURE — 83550 IRON BINDING TEST: CPT

## 2022-01-01 PROCEDURE — APPSS60 APP SPLIT SHARED TIME 46-60 MINUTES: Performed by: NURSE PRACTITIONER

## 2022-01-01 PROCEDURE — 85610 PROTHROMBIN TIME: CPT

## 2022-01-01 PROCEDURE — 82247 BILIRUBIN TOTAL: CPT

## 2022-01-01 PROCEDURE — 84157 ASSAY OF PROTEIN OTHER: CPT

## 2022-01-01 PROCEDURE — 87536 HIV-1 QUANT&REVRSE TRNSCRPJ: CPT

## 2022-01-01 PROCEDURE — 83520 IMMUNOASSAY QUANT NOS NONAB: CPT

## 2022-01-01 PROCEDURE — 87483 CNS DNA AMP PROBE TYPE 12-25: CPT

## 2022-01-01 PROCEDURE — 82248 BILIRUBIN DIRECT: CPT

## 2022-01-01 PROCEDURE — APPNB30 APP NON BILLABLE TIME 0-30 MINS: Performed by: NURSE PRACTITIONER

## 2022-01-01 PROCEDURE — 83605 ASSAY OF LACTIC ACID: CPT

## 2022-01-01 PROCEDURE — 82306 VITAMIN D 25 HYDROXY: CPT

## 2022-01-01 PROCEDURE — 83540 ASSAY OF IRON: CPT

## 2022-01-01 PROCEDURE — P9012 CRYOPRECIPITATE EACH UNIT: HCPCS

## 2022-01-01 PROCEDURE — 85055 RETICULATED PLATELET ASSAY: CPT

## 2022-01-01 PROCEDURE — 83010 ASSAY OF HAPTOGLOBIN QUANT: CPT

## 2022-01-01 PROCEDURE — 99281 EMR DPT VST MAYX REQ PHY/QHP: CPT

## 2022-01-01 PROCEDURE — 84145 PROCALCITONIN (PCT): CPT

## 2022-01-01 PROCEDURE — APPNB45 APP NON BILLABLE 31-45 MINUTES: Performed by: NURSE PRACTITIONER

## 2022-01-01 PROCEDURE — 82533 TOTAL CORTISOL: CPT

## 2022-01-01 PROCEDURE — 70553 MRI BRAIN STEM W/O & W/DYE: CPT

## 2022-01-01 PROCEDURE — 4500000201 HC EMERGENCY DEPT VISIT NO LEVEL OF CARE

## 2022-01-01 RX ORDER — METHYLPREDNISOLONE SODIUM SUCCINATE 40 MG/ML
40 INJECTION, POWDER, LYOPHILIZED, FOR SOLUTION INTRAMUSCULAR; INTRAVENOUS DAILY
Status: CANCELLED | OUTPATIENT
Start: 2022-01-01

## 2022-01-01 RX ORDER — POTASSIUM CHLORIDE 7.45 MG/ML
20 INJECTION INTRAVENOUS ONCE
Status: DISCONTINUED | OUTPATIENT
Start: 2022-01-01 | End: 2022-01-01 | Stop reason: HOSPADM

## 2022-01-01 RX ORDER — SODIUM CHLORIDE 0.9 % (FLUSH) 0.9 %
5-40 SYRINGE (ML) INJECTION PRN
Status: DISCONTINUED | OUTPATIENT
Start: 2022-01-01 | End: 2022-01-01

## 2022-01-01 RX ORDER — SODIUM CHLORIDE 0.9 % (FLUSH) 0.9 %
10 SYRINGE (ML) INJECTION AS NEEDED
Status: DISCONTINUED | OUTPATIENT
Start: 2022-01-01 | End: 2022-01-01

## 2022-01-01 RX ORDER — SODIUM CHLORIDE 0.9 % (FLUSH) 0.9 %
5-40 SYRINGE (ML) INJECTION PRN
Status: CANCELLED
Start: 2022-01-01

## 2022-01-01 RX ORDER — ALBUMIN, HUMAN INJ 5% 5 %
3500 SOLUTION INTRAVENOUS ONCE
Status: COMPLETED | OUTPATIENT
Start: 2022-01-01 | End: 2022-01-01

## 2022-01-01 RX ORDER — SODIUM CHLORIDE 9 MG/ML
INJECTION, SOLUTION INTRAVENOUS PRN
Status: COMPLETED | OUTPATIENT
Start: 2022-01-01 | End: 2022-01-01

## 2022-01-01 RX ORDER — ALBUMIN, HUMAN INJ 5% 5 %
3571 SOLUTION INTRAVENOUS ONCE
Status: COMPLETED | OUTPATIENT
Start: 2022-01-01 | End: 2022-01-01

## 2022-01-01 RX ADMIN — SODIUM CHLORIDE: 9 INJECTION, SOLUTION INTRAVENOUS at 09:13

## 2022-01-01 RX ADMIN — SODIUM CHLORIDE, PRESERVATIVE FREE 20 ML: 5 INJECTION INTRAVENOUS at 10:28

## 2022-01-01 RX ADMIN — GADOTERIDOL 20 ML: 279.3 INJECTION, SOLUTION INTRAVENOUS at 19:45

## 2022-01-01 RX ADMIN — SODIUM CHLORIDE, PRESERVATIVE FREE 10 ML: 5 INJECTION INTRAVENOUS at 19:45

## 2022-01-01 RX ADMIN — CALCIUM GLUCONATE 2000 MG: 98 INJECTION, SOLUTION INTRAVENOUS at 13:40

## 2022-01-01 RX ADMIN — CALCIUM GLUCONATE: 98 INJECTION, SOLUTION INTRAVENOUS at 16:25

## 2022-01-01 RX ADMIN — SODIUM CHLORIDE, PRESERVATIVE FREE 10 ML: 5 INJECTION INTRAVENOUS at 13:48

## 2022-01-01 RX ADMIN — ALBUMIN (HUMAN) 3500 ML: 12.5 INJECTION, SOLUTION INTRAVENOUS at 09:13

## 2022-01-01 RX ADMIN — CALCIUM GLUCONATE 2000 MG: 98 INJECTION, SOLUTION INTRAVENOUS at 09:10

## 2022-01-01 RX ADMIN — ALBUMIN (HUMAN) 3500 ML: 12.5 INJECTION, SOLUTION INTRAVENOUS at 16:25

## 2022-01-01 RX ADMIN — SODIUM CHLORIDE, PRESERVATIVE FREE 10 ML: 5 INJECTION INTRAVENOUS at 09:00

## 2022-01-01 RX ADMIN — SODIUM CHLORIDE, PRESERVATIVE FREE 10 ML: 5 INJECTION INTRAVENOUS at 16:25

## 2022-01-01 RX ADMIN — ALBUMIN (HUMAN) 3500 ML: 12.5 INJECTION, SOLUTION INTRAVENOUS at 13:40

## 2022-01-01 ASSESSMENT — PAIN SCALES - WONG BAKER: WONGBAKER_NUMERICALRESPONSE: 0

## 2022-06-14 NOTE — ED TRIAGE NOTES
Patient arrives via stretcher from [place]. Here for neuro consult. Decreasing mental status over for quite some time. Recently sent from [facility] on 6/8 and sent to [facility] for LTAC services.

## 2022-06-14 NOTE — ED NOTES
I have reviewed discharge instructions with the patient. The patient verbalized understanding. Patient left ED via Discharge Method: stretcher to [facility] AT [facility] with transport and [employer]. Opportunity for questions and clarification provided. Patient given 0 scripts. To continue your aftercare when you leave the hospital, you may receive an automated call from our care team to check in on how you are doing. This is a free service and part of our promise to provide the best care and service to meet your aftercare needs.  If you have questions, or wish to unsubscribe from this service please call [phone]. Thank you for Choosing our [employer] Emergency Department.         Janessa Eaton [facility]  06/14/22 7403

## 2022-06-14 NOTE — CONSULTS
Consult    Patient: Cristo Marti MRN: [id number]  SSN: [id number]    YOB: 1950  Age: 73 y.o. Sex: male      Subjective:      Cristo Marti is a 73 y.o. male who is being seen for follow-up of a subacute devastating illness characterized by the initial presentation of encephalopathy followed by development of a myelitis lumbar punctures consistent with bacterial meningitis. Pulmonary coarse with plugging and multiple bronchoscopies. Eventually required a tracheostomy. The patient was seen by infectious disease neurology pulmonology during his course at [facility]. The charts have been extensively reviewed,  It is apparent this was a subacute illness the patient had been out playing golf a week before there was no foreign travel  No past medical history on file. No past surgical history on file. No family history on file. Social History     Tobacco Use    Smoking status: Not on file    Smokeless tobacco: Not on file   Substance Use Topics    Alcohol use: Not on file      No current facility-administered medications for this encounter. No current outpatient medications on file. No Known Allergies    Review of Systems:  The patient is not sufficiently responsive to obtain review of systems    Objective:   Extensive review of the med records  Vitals:    06/14/22 0908 06/14/22 0915 06/14/22 0930 06/14/22 0945   BP: (!) 133/91 119/72 (!) 131/92 (!) 124/95   Pulse: 98      Resp: 22      Temp: 98 °F (36.7 °C)      TempSrc: Axillary      SpO2: 93% 90% 90% (!) 87%        Physical Exam:  The patient appears to be awake tracheostomy is in place he is verbally mute. He is not following commands in the upper extremities spontaneous movements are noted about the eyes. There is no unilateral ptosis. Pupils are equal.  They are reactive. Profound rigidity persists in the cervical spine with markedly positive Brudzinski sign.   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 Examination in the periphery demonstrates no evidence of splinter hemorrhages etc.    Assessment:   Would concur that most likely this is a bacterial meningitis although the actual source is unclear. 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 It is occurred in the watershed zone area of the spinal cord in the mid to lower thoracic area. Case series of similar cases was written out by Rakesh Brand et al. back in 2001 with MRI correlations. The extent of the myelopathy makes any return of lower extremity function extremely guarded  The issues which need to be addressed currently are CNS. He is in need of repeat imaging to ensure that he has not developed a degree of communicating hydrocephalus which is certainly a possibility. Intracranial venous sinus thrombosis is also a possibility. An additional consideration in terms of his CSF picture would be the remote possibility of carcinomatous meningitis. He has had I assume although I was not able to actually find it multiple assessments in terms of presence of TB.   The additional assessments looking for fungal and other etiology on the CSF are complete    Plan:     I would recommend at this point a repeat brain MRI with and without contrast.  As noted at this time juncture it is important to assess whether a degree of hydrocephalus is developing, in addition to the venous thrombophlebitis issues described above  A repeat LP as per suggestion of the team at [facility] is I believe appropriate and at the time that that LP is obtained by interventional radiology here complete reassessment of the CSF for infectious and neoplastic etiologies needs to be performed    I would concur with the patient's current pharmacotherapeutic's and would have no specific additions      Signed By: Yolette Tobias MD     June 14, 2022

## 2022-06-14 NOTE — ED PROVIDER NOTES
From [facility] AT [facility] to see neurology which does not preclude use of there.   Patient was not involved with ER doctor intervention or decision making and all care was provided by Dr. Zion Brooks and Dr. Zion Brooks has evaluated patient is asked patient to be return to [facility] and has ordered a contrasted MRI     Jamal Keating MD  06/14/22 1841

## 2022-06-15 PROBLEM — G37.3 TRANSVERSE MYELITIS (HCC): Status: ACTIVE | Noted: 2022-01-01

## 2022-06-15 NOTE — CONSULTS
[facility] Hematology & Oncology        Inpatient Hematology / Oncology Plan of Care    Reason for Consult:  other  Referring Physician:  Meche Baum MD    History of Present Illness:  Mr. Elvira Cruz is a 73 y.o. male admitted on 6/8/2022 to [facility] from [facility] who is seen at the request of Dr. Sandra Crowley for the need of plasmapheresis for focal transverse myelitis documented on MRI. Patient history copied from Dr. Jose Horton note in care everywhere 6/1/2022. A few days before admission the patient started feeling fatigued and having urinary frequency. Patient went to an urgent care the day before admission and was diagnosed with UTI and was given a prescription for Bactrim. Patient took a total of 3 doses of Bactrim and then became confused and mildly disoriented. Noted to have tremors and unsteadiness when walking. He was brought to the ER and was found to have mild confusion/disorientation along with tremors and truncal ataxia and difficulty with finger-to-nose coordination on exam. Also, with gait instability. CT head and CTA head neck were grossly negative. Also noted to have rhabdomyolysis and acute kidney injury with creatinine 1.73. He was admitted to the hospital and started on broad-spectrum antibiotics with intravenous vancomycin and Zosyn for treatment of sepsis, at that time thought to be secondary to an UTI/urinary obstruction. His mental status deteriorated, and he became stuporous, he developed abundant lung secretions with mucus plugging. He had to be intubated and underwent several bronchoscopies to remove mucus plugging. He was extubated on 05/27 but he declined and had recurrent mucus plugs and he had to be reintubated on 5/30th due to severe left lung atelectasis. Pulmonary, Neurology and Infectious Diseases were consulted. The patient had lumbar tap done on 05/24 with overall picture not being entirely clear.  One of his tubes showed 116 white blood cells, his CSF bacterial Gram stain was negative, culture negative his CSF Lyme IgG showed 2 bands and IgM was negative, his meningitis encephalitis panel was negative. Decision was to treat him empirically with IV ampicillin, IV ceftriaxone (for possible partially treated bacterial meningitis), IV doxycycline (for possible Lyme) and empiric fluconazole. He had a repeat lumbar tap on 06/03 showing glucose 27, white blood cells 88 with 88% monocytes, protein 182, meningitis encephalitis panel negative, Rickettsia antibody, Earlecia antibody, West Nile antibody were negative. HIV and RPR have been negative. Fungitell, Histoplasma antigen and Aspergillus antigen were negative. CSF Lyme testing done on 6 tree is still pending. Due to prolonged intubation the patient had to have a tracheostomy and PEG tube inserted by General surgery. It was noticed that patient had poor mobility of the lower extremities. A thoracic MRI done on 06/01/2022 reported findings concerning for long segment ventral thoracic cord signal abnormality which is non expansile. This morphology could be seen with anterior spinal artery territory spinal cord infarct with differentials being transfer myelitis. Repeat MRI in 6-8 weeks was recommended. Neurology was consulted and no acute intervention was recommended, poor neurological prognosis regarding spinal infarction was discussed as a high possibility. The patient has remained hemodynamically stable, he has remained dependent on a trach collar and a PEG tube. He only has poor responses to stimuli. Sometimes he follows commands. On 06/07/2022 the patient developed some vomiting. An abdominal x-ray was suggestive of ileus. Tube feedings was stopped, and he was placed NPO. IV Reglan was ordered. A repeat lumbar tap was planned for 06/09/2022. On 06/08/2022 a repeat abdominal x-ray shows improvement of ileus. A chest x-ray showing new left lower lobe infiltrate which might be atelectasis.  He will need follow-up up regarding this finding. He was hypernatremia and IV D5W was order. On 06/08/2022 we were informed that patient had an available bed at [facility] [place]. His family adamantly requested him being transferred today. They do not want to wait for the repeat LP planned for 06/09/2022. Today ID recommended stopping IV ceftriaxone and IV fluconazole and to continue IV ampicillin for 3 more days and IV doxycycline for 7 more days    Review of Systems: patient somnolent during exam, denies pain    Physical Exam:  Constitutional: Well developed,  male in no acute distress, sitting comfortably in the hospital bed. HEENT: Normocephalic and atraumatic. Oropharynx is clear, mucous membranes are moist.  Pupils are equal, round, and reactive to light. Extraocular muscles are intact. Sclerae anicteric. Has trach   Skin Warm and dry. No bruising and no rash noted. No erythema. No pallor. Respiratory Lungs are clear to auscultation bilaterally without wheezes, rales or rhonchi, normal air exchange without accessory muscle use. CVS Normal rate, regular rhythm and normal S1 and S2. No murmurs, gallops, or rubs. Abdomen Soft, nontender and nondistended, normoactive bowel sounds. Has PEG   Neuro Verbally mute.     Psych Somnolent       Labs:    Recent Results (from the past 24 hour(s))   CBC with Auto Differential    Collection Time: 06/15/22  5:44 AM   Result Value Ref Range    WBC 4.1 (L) 4.3 - 11.1 K/uL    RBC 4.05 (L) 4.23 - 5.6 M/uL    Hemoglobin 11.8 (L) 13.6 - 17.2 g/dL    Hematocrit 35.7 (L) 41.1 - 50.3 %    MCV 88.1 79.6 - 97.8 FL    MCH 29.1 26.1 - 32.9 PG    MCHC 33.1 31.4 - 35.0 g/dL    RDW 15.5 (H) 11.9 - 14.6 %    Platelets 470 619 - 127 K/uL    MPV 10.5 9.4 - 12.3 FL    nRBC 0.00 0.0 - 0.2 K/uL    Differential Type AUTOMATED      Seg Neutrophils 80 (H) 43 - 78 %    Lymphocytes 16 13 - 44 %    Monocytes 4 4.0 - 12.0 %    Eosinophils % 0 (L) 0.5 - 7.8 %    Basophils 0 0.0 - 2.0 %    Immature Granulocytes 0 0.0 - 5.0 % Segs Absolute 3.3 1.7 - 8.2 K/UL    Absolute Lymph # 0.6 0.5 - 4.6 K/UL    Absolute Mono # 0.2 0.1 - 1.3 K/UL    Absolute Eos # 0.0 0.0 - 0.8 K/UL    Basophils Absolute 0.0 0.0 - 0.2 K/UL    Absolute Immature Granulocyte 0.0 0.0 - 0.5 K/UL   Basic Metabolic Panel    Collection Time: 06/15/22  5:44 AM   Result Value Ref Range    Sodium 146 (H) 136 - 145 mmol/L    Potassium 3.1 (L) 3.5 - 5.1 mmol/L    Chloride 114 (H) 98 - 107 mmol/L    CO2 25 21 - 32 mmol/L    Anion Gap 7 7 - 16 mmol/L    Glucose 150 (H) 65 - 100 mg/dL    BUN 30 (H) 8 - 23 MG/DL    CREATININE 0.50 (L) 0.8 - 1.5 MG/DL    GFR African American >60 >60 ml/min/1.73m2    GFR Non- >60 >60 ml/min/1.73m2    Calcium 9.4 8.3 - 10.4 MG/DL   Magnesium    Collection Time: 06/15/22  5:44 AM   Result Value Ref Range    Magnesium 2.1 1.8 - 2.4 mg/dL   Phosphorus    Collection Time: 06/15/22  5:44 AM   Result Value Ref Range    Phosphorus 2.1 (L) 2.3 - 3.7 MG/DL         RECOMMENDATIONS:  Focal transverse myelitis  6/15  Day one plasmapheresis    Lab studies and imaging studies were personally reviewed. Pertinent old records were reviewed. Thank you for allowing us to participate in the care of Mr. Sandra Oneal.           Jeffry Baum, APRN - NP   [facility] Hematology & Oncology  [address] [employer]  Office : [phone]  Fax : [phone]

## 2022-06-16 NOTE — PROGRESS NOTES
[facility] Hematology & Oncology        Inpatient Hematology / Oncology Progress Note      Admission Date: 6/8/2022  7:34 PM  Reason for Admission/Hospital Course: other      24 Hour Events:  Day 1/7 planned plasmapheresis  Wife at bedside  Awake/eyes open    ROS: Unable to obtain due to mental status    10 point review of systems is otherwise negative with the exception of the elements mentioned above in the HPI. No Known Allergies    OBJECTIVE:  No data found. No data recorded. No intake/output data recorded. Physical Exam:  Constitutional: Well developed,  male in no acute distress, sitting comfortably in the hospital bed. HEENT: Normocephalic and atraumatic. Oropharynx is clear, mucous membranes are moist.  Pupils are equal, round, and reactive to light. Extraocular muscles are intact. Sclerae anicteric. Has trach   Skin Warm and dry. No bruising and no rash noted. No erythema. No pallor. Respiratory Lungs are clear to auscultation bilaterally without wheezes, rales or rhonchi, normal air exchange without accessory muscle use. CVS Normal rate, regular rhythm and normal S1 and S2. No murmurs, gallops, or rubs. Abdomen Soft, nontender and nondistended, normoactive bowel sounds. Has PEG   Neuro Verbally mute. Psych Somnolent        Labs:      Recent Labs     06/15/22  0544 06/16/22  0538   WBC 4.1* 4.9   RBC 4.05* 3.81*   HGB 11.8* 11.3*   HCT 35.7* 34.3*   MCV 88.1 90.0   MCH 29.1 29.7   MCHC 33.1 32.9   RDW 15.5* 15.9*    210   MPV 10.5 10.7        Recent Labs     06/15/22  0544 06/15/22  1310 06/16/22  0538   * 147* 147*   K 3.1* 3.0* 3.2*   * 114* 115*   CO2 25 25 27   BUN 30* 33* 36*   GFRAA >60 >60 >60   GLOB  --  3.3 3.2   MG 2.1  --   --    PHOS 2.1*  --   --        PLAN:  Focal transverse myelitis  6/16  Day 1/7 plasmapheresis every other day. Dr. Sophy Brody wants to do 7 treatments. Dr. Vivi South in agreement.      Goals and plan of care reviewed with the wife. All questions answered to the best of our ability.             XOCHILT Denson NP Hematology & Oncology  [address] [address]  Office : [phone]  Fax : [phone]

## 2022-06-16 NOTE — CONSULTS
on [facility] Hematology and Oncology: Inpatient Hematology / Oncology Consult Note    Reason for Consult:    Referring Physician:  Cherelle Chester MD    History of Present Illness:  Mr. Lee Pruett is a 73 y.o. male admitted on 6/8/2022 . Mr Lee Pruett is a 71yo man admitted to [place] from [facility]. Few days PTA pt developed fatigue and urinary frequency. He was evaluated at urgent care, diagnosed with UTI and started on Bactrim. He developed confusion after 3 doses of abx. In ED, AMS, tremors and truncal ataxia were noted. CT head and CTA head/neck were negative. Due to TAVIA he was started on vanc/Zosyn. Mental status deteriorated, he was unable to clear secretions requiring intubation. He was extubated on 5/27 and then re-intubated on 5/30. LP on 5/24 led to pt's tx empirically with ampicillin, ceftriaxone and doxy, as well as, fluconazole. Repeat LP on 6/3 led to additional testing for varius organisms and neg. Due to prolonged intubation trach and PEG were inserted. Thoracic MR from 6/1 showed segment of abnormal signal concerning for myelitis. We were asked by neurology to initiate plasmapheresis for transverse myelitis. Review of Systems: unable to obtain - pt answers some questions by nodding     No Known Allergies  No past medical history on file. No past surgical history on file. No family history on file.   Social History     Socioeconomic History    Marital status:      Spouse name: Not on file    Number of children: Not on file    Years of education: Not on file    Highest education level: Not on file   Occupational History    Not on file   Tobacco Use    Smoking status: Not on file    Smokeless tobacco: Not on file   Substance and Sexual Activity    Alcohol use: Not on file    Drug use: Not on file    Sexual activity: Not on file   Other Topics Concern    Not on file   Social History Narrative    Not on file     Social Determinants of Health     Financial Resource Strain:    Emelia Solum Difficulty of Paying Living Expenses: Not on file   Food Insecurity:     Worried About Running Out of Food in the Last Year: Not on file    Ran Out of Food in the Last Year: Not on file   Transportation Needs:     Lack of Transportation (Medical): Not on file    Lack of Transportation (Non-Medical): Not on file   Physical Activity:     Days of Exercise per Week: Not on file    Minutes of Exercise per Session: Not on file   Stress:     Feeling of Stress : Not on file   Social Connections:     Frequency of Communication with Friends and Family: Not on file    Frequency of Social Gatherings with Friends and Family: Not on file    Attends [employer] Services: Not on file    Active Member of [address] [place] [address] or Organizations: Not on file    Attends Club or Organization Meetings: Not on file    Marital Status: Not on file   Intimate Partner Violence:     Fear of Current or Ex-Partner: Not on file    Emotionally Abused: Not on file    Physically Abused: Not on file    Sexually Abused: Not on file   Housing Stability:     Unable to Pay for Housing in the Last Year: Not on file    Number of [place] in the Last Year: Not on file    Unstable Housing in the Last Year: Not on file     Current Facility-Administered Medications   Medication Dose Route Frequency Provider Last Rate Last Admin    sodium chloride flush 0.9 % injection 10 mL  10 mL IntraVENous PRN Reji Garduno MD   10 mL at 06/14/22 1945       OBJECTIVE:  No data found. No data recorded. No intake/output data recorded. Physical Exam:  Constitutional: Well developed, well nourished male resting in bed; not verbal    HEENT: Normocephalic and atraumatic. Trach in place    Lymph node   No palpable submandibular, cervical lymph nodes. Skin Warm and dry, clammy on the forehead. No bruising and no rash noted.      Respiratory Lungs with scattered rhonchi    CVS Normal rate, regular rhythm    Abdomen Soft, nontender and nondistended   Neuro Opens eyes to verbal stimulus, + muscle wasting in LE    MSK No edema and no tenderness.    Psych Unable to assess      Labs:    Recent Results (from the past 24 hour(s))   CBC with Auto Differential    Collection Time: 06/15/22  5:44 AM   Result Value Ref Range    WBC 4.1 (L) 4.3 - 11.1 K/uL    RBC 4.05 (L) 4.23 - 5.6 M/uL    Hemoglobin 11.8 (L) 13.6 - 17.2 g/dL    Hematocrit 35.7 (L) 41.1 - 50.3 %    MCV 88.1 79.6 - 97.8 FL    MCH 29.1 26.1 - 32.9 PG    MCHC 33.1 31.4 - 35.0 g/dL    RDW 15.5 (H) 11.9 - 14.6 %    Platelets 954 662 - 708 K/uL    MPV 10.5 9.4 - 12.3 FL    nRBC 0.00 0.0 - 0.2 K/uL    Differential Type AUTOMATED      Seg Neutrophils 80 (H) 43 - 78 %    Lymphocytes 16 13 - 44 %    Monocytes 4 4.0 - 12.0 %    Eosinophils % 0 (L) 0.5 - 7.8 %    Basophils 0 0.0 - 2.0 %    Immature Granulocytes 0 0.0 - 5.0 %    Segs Absolute 3.3 1.7 - 8.2 K/UL    Absolute Lymph # 0.6 0.5 - 4.6 K/UL    Absolute Mono # 0.2 0.1 - 1.3 K/UL    Absolute Eos # 0.0 0.0 - 0.8 K/UL    Basophils Absolute 0.0 0.0 - 0.2 K/UL    Absolute Immature Granulocyte 0.0 0.0 - 0.5 K/UL   Basic Metabolic Panel    Collection Time: 06/15/22  5:44 AM   Result Value Ref Range    Sodium 146 (H) 136 - 145 mmol/L    Potassium 3.1 (L) 3.5 - 5.1 mmol/L    Chloride 114 (H) 98 - 107 mmol/L    CO2 25 21 - 32 mmol/L    Anion Gap 7 7 - 16 mmol/L    Glucose 150 (H) 65 - 100 mg/dL    BUN 30 (H) 8 - 23 MG/DL    CREATININE 0.50 (L) 0.8 - 1.5 MG/DL    GFR African American >60 >60 ml/min/1.73m2    GFR Non- >60 >60 ml/min/1.73m2    Calcium 9.4 8.3 - 10.4 MG/DL   Magnesium    Collection Time: 06/15/22  5:44 AM   Result Value Ref Range    Magnesium 2.1 1.8 - 2.4 mg/dL   Phosphorus    Collection Time: 06/15/22  5:44 AM   Result Value Ref Range    Phosphorus 2.1 (L) 2.3 - 3.7 MG/DL   Comprehensive Metabolic Panel    Collection Time: 06/15/22  1:10 PM   Result Value Ref Range    Sodium 147 (H) 136 - 145 mmol/L    Potassium 3.0 (L) 3.5 - 5.1 mmol/L    Chloride 114 (H) 98 - 107 mmol/L    CO2 25 21 - 32 mmol/L    Anion Gap 8 7 - 16 mmol/L    Glucose 141 (H) 65 - 100 mg/dL    BUN 33 (H) 8 - 23 MG/DL    CREATININE 0.50 (L) 0.8 - 1.5 MG/DL    GFR African American >60 >60 ml/min/1.73m2    GFR Non- >60 >60 ml/min/1.73m2    Calcium 9.1 8.3 - 10.4 MG/DL    Total Bilirubin 0.7 0.2 - 1.1 MG/DL    ALT 64 12 - 65 U/L    AST 27 15 - 37 U/L    Alk Phosphatase 77 50 - 136 U/L    Total Protein 5.8 (L) 6.3 - 8.2 g/dL    Albumin 2.5 (L) 3.2 - 4.6 g/dL    Globulin 3.3 2.3 - 3.5 g/dL    Albumin/Globulin Ratio 0.8 (L) 1.2 - 3.5     Fibrinogen    Collection Time: 06/15/22  1:10 PM   Result Value Ref Range    Fibrinogen 395 190 - 501 mg/dL   APTT    Collection Time: 06/15/22  1:10 PM   Result Value Ref Range    PTT 25.5 24.1 - 35.1 SEC       Imaging:  Reviewed     ASSESSMENT:    Active Problems:    Transverse myelitis (HCC)  Resolved Problems:    * No resolved hospital problems. *      PLAN / RECOMMENDATIONS:  Lab studies and imaging studies were reviewed. Pertinent old records were reviewed. 1. Transverse myelitis   - we have been asked to p/w plasmaphersis per neurology   - discussed process with wife at bedside and she's in agreement   - electrolyte replacement per primary team       Thank you for allowing me to participate in the care of Mr. Khurram Clark.         Mode Chao MD, MD  [facility] Hematology and Oncology  [address] [place] [address]  Office : [phone]  Fax : [phone]

## 2022-06-16 NOTE — PROGRESS NOTES
Procedure: Therapeutic Plasmapheresis  Dx: focal transverse myeltis  Day: 1   Labs drawn: ptt, fibrinogen, cbc  Fibrinogen: 344  Calcium used:  2 grams  Replacement product: albumin  Replace volume: 3603  /  Plasma Remove volume: 3846  Fluid Balance: 100%  TPV: 1  Single needle/Double needle:double needle  Issues: none  Next procedure date: 6/18/22  Labs needed: see treatment plan  Tolerated procedure:well    Consent obtained via phone from Kenia Hodgson (pts spouse). Two nurses verified consent.

## 2022-06-18 NOTE — PROGRESS NOTES
Procedure: Therapeutic Plasmapheresis  Dx: Focal transverse myeltis  Day: 2   Labs drawn: ptt, fibrinogen, cbc  Fibrinogen: 145  Calcium used:  2 grams  Replacement product: albumin  Replace volume: 3545 ml  /  Plasma Remove volume: 3786 ml  Fluid Balance: 100%  TPV: 1  Single needle/Double needle:double needle  Issues: none  Next procedure date: 6/20/22  Labs needed: see treatment plan  Tolerated procedure:well

## 2022-06-18 NOTE — PROGRESS NOTES
[address] [place] [address] Hematology & Oncology        Inpatient Hematology / Oncology Progress Note      Admission Date: 6/8/2022  7:34 PM  Reason for Admission/Hospital Course: other      24 Hour Events:  Day 2/7 planned plasmapheresis  Awake/eyes open    ROS: Unable to obtain due to mental status    10 point review of systems is otherwise negative with the exception of the elements mentioned above in the HPI. No Known Allergies    OBJECTIVE:  No data found. No data recorded. No intake/output data recorded. Physical Exam:  Constitutional: Well developed,  male in no acute distress, sitting comfortably in the hospital bed. HEENT: Normocephalic and atraumatic. Oropharynx is clear, mucous membranes are moist.  Pupils are equal, round, and reactive to light. Extraocular muscles are intact. Sclerae anicteric. Has trach   Skin Warm and dry. No bruising and no rash noted. No erythema. No pallor. Respiratory Lungs are clear to auscultation bilaterally without wheezes, rales or rhonchi, normal air exchange without accessory muscle use. CVS Normal rate, regular rhythm and normal S1 and S2. No murmurs, gallops, or rubs. Abdomen Soft, nontender and nondistended, normoactive bowel sounds. Has PEG   Neuro Verbally mute. Psych Somnolent        Labs:      Recent Labs     06/16/22  0538 06/17/22  0735 06/18/22  0551   WBC 4.9 5.1 6.2   RBC 3.81* 3.82* 3.82*   HGB 11.3* 11.3* 11.2*   HCT 34.3* 34.3* 34.4*   MCV 90.0 89.8 90.1   MCH 29.7 29.6 29.3   MCHC 32.9 32.9 32.6   RDW 15.9* 16.2* 16.0*    178 170   MPV 10.7 11.0 11.2        Recent Labs     06/16/22  0538 06/16/22 2059 06/17/22  0735 06/18/22  0551   *  --  150* 148*   K 3.2*  --  3.4* 3.2*   *  --  119* 115*   CO2 27  --  24 26   BUN 36*  --  36* 40*   GFRAA >60  --  >60 >60   GLOB 3.2  --  1.6* 1.7*   MG  --  2.1  --  2.2   PHOS  --   --   --  2.0*       PLAN:  Focal transverse myelitis  6/16  Day 1/7 plasmapheresis every other day.   Nahid Alvarenga wants to do 7 treatments. Dr. Roberto in agreement. 6/18 Day 2/7 plasmapheresis - q other day. Tolerated 1st session well. Goals and plan of care reviewed with the wife. All questions answered to the best of our ability.             Alexandru Kraus MD, MD   [facility] Hematology & Oncology  [address] [place] [address]  Office : [phone]  Fax : [phone]

## 2022-06-20 NOTE — PROGRESS NOTES
[facility]        Inpatient Hematology / Oncology Progress Note      Admission Date: 2022  7:34 PM  Reason for Admission/Hospital Course: other      24 Hour Events:  Day 3/7 plasmapheresis  Cryo ordered for fibrinogen <100    ROS: Unable to obtain due to mental status    10 point review of systems is otherwise negative with the exception of the elements mentioned above in the HPI. No Known Allergies    OBJECTIVE:  Patient Vitals for the past 8 hrs:   BP Temp Temp src Pulse Resp SpO2   22 0900 125/79 98.5 °F (36.9 °C) Oral 97 17 96 %     Temp (24hrs), Av.5 °F (36.9 °C), Min:98.5 °F (36.9 °C), Max:98.5 °F (36.9 °C)     07 -  1900  In: 322 [I.V.:322]  Out: 3852     Physical Exam:  Constitutional: Well developed, ill-appearing male in no acute distress, sitting comfortably in the bedside chair. HEENT: Normocephalic and atraumatic. Oropharynx is clear, mucous membranes are moist.  Pupils are equal, round, and reactive to light. Extraocular muscles are intact. Sclerae anicteric. Has trach   Skin Warm and dry. No bruising and no rash noted. No erythema. No pallor. Respiratory Lungs are clear to auscultation bilaterally without wheezes, rales or rhonchi, normal air exchange without accessory muscle use. CVS Normal rate, regular rhythm and normal S1 and S2. No murmurs, gallops, or rubs. Abdomen Soft, nontender and nondistended, normoactive bowel sounds. Has PEG   Neuro Verbally mute.     Psych Somnolent        Labs:      Recent Labs     22  0551 22  0657 22  0541   WBC 6.2 7.6 14.0*   RBC 3.82* 3.81* 3.50*   HGB 11.2* 11.4* 10.4*   HCT 34.4* 33.9* 31.5*   MCV 90.1 89.0 90.0   MCH 29.3 29.9 29.7   MCHC 32.6 33.6 33.0   RDW 16.0* 16.5* 16.9*    153 130*   MPV 11.2 10.8 11.0        Recent Labs     22  0551 22  0657 22  0541   * 147* 147*   K 3.2* 4.1 4.0   * 118* 118*   CO2 26 23 21   BUN 40* 39* 44* GFRAA >60 >60 >60   GLOB 1.7* 1.0* 1.2*   MG 2.2 2.1  --    PHOS 2.0* 2.1*  --        PLAN:  Focal transverse myelitis  6/16  Day 1/7 plasmapheresis every other day. Dr. Roger Looney wants to do 7 treatments. Dr. Rigo Hdz in agreement. 6/18 Day 2/7 plasmapheresis - q other day. Tolerated 1st session well. 6/20 Day 3/7. Fibrinogen 87, transfuse 10 units cryo. Thank you for allowing us to participate in the care of Mr. Britt Cordoba. We will continue to follow along while undergoing plasmapheresis.             XOCHILT Millerigen  Hematology & Oncology  [address] [address]  Office : [phone]  Fax : [phone]

## 2022-06-20 NOTE — PROGRESS NOTES
Procedure: Therapeutic Plasmapheresis  Dx: Focal Transverse Myelitis  Day: 3 of 7  Labs drawn: PTT fibrinogen CBC  Fibrinogen: 87  Calcium used:  2 grams  Replacement product: Saline/Albumin  Replace volume: 3618  /  Plasma Remove volume: 3852  Fluid Balance: 100%  TPV: 1  Double needle  Issues: none  Next procedure date: 06/22/2022  Labs needed: CBC, PTT, Fibrinogen  Tolerated procedure:well

## 2022-06-21 NOTE — PROGRESS NOTES
Spiritual Care Visit, initial visit.  was called when patient . While waiting in the corridor to enter the room, patient's spouse exited and walked away. No other family was present, but  waited for some time, then left, but requested staff to call if she returned. Visit by Dustin Luna, Staff .  [email or web address].Wallace., Dorene.HEMAL., B.A.

## 2022-06-21 NOTE — PROGRESS NOTES
Spiritual Care Visit, Follow up visit attempted.  returned to floor to see if patient's wife had returned, and to Chart the original Consult. No family had come back to the hospital. Kash Crane did pray on behalf of family. Visit by Willa Ortiz, Staff .  M.Ed., Th.B., B.A.

## 2022-06-22 LAB
ANION GAP SERPL CALC-SCNC: 12 MMOL/L (ref 7–16)
B BURGDOR IGG CSF-ACNC: <0.08 INDEX (ref 0–0.09)
B BURGDOR IGM CSF-ACNC: <0.06 INDEX (ref 0–0.06)
BUN SERPL-MCNC: 62 MG/DL (ref 8–23)
CALCIUM SERPL-MCNC: 7.9 MG/DL (ref 8.3–10.4)
CHLORIDE SERPL-SCNC: 125 MMOL/L (ref 98–107)
CO2 SERPL-SCNC: 15 MMOL/L (ref 21–32)
CREAT SERPL-MCNC: 0.5 MG/DL (ref 0.8–1.5)
GLUCOSE SERPL-MCNC: 167 MG/DL (ref 65–100)
Lab: NORMAL
M TB IFN-G BLD-IMP: NEGATIVE
MAGNESIUM SERPL-MCNC: 2.2 MG/DL (ref 1.8–2.4)
PHOSPHATE SERPL-MCNC: 2.3 MG/DL (ref 2.3–3.7)
POTASSIUM SERPL-SCNC: 3.9 MMOL/L (ref 3.5–5.1)
QUANTIFERON CRITERIA: NORMAL
QUANTIFERON MITOGEN VALUE: 9.72 IU/ML
QUANTIFERON NIL VALUE: 0.01 IU/ML
QUANTIFERON TB1 AG: 0.02 IU/ML
QUANTIFERON TB2 AG: 0.02 IU/ML
SODIUM SERPL-SCNC: 152 MMOL/L (ref 138–145)

## 2022-06-28 LAB
Lab: NORMAL
Lab: NORMAL
REFERENCE LAB: NORMAL

## 2022-07-28 LAB
ACID FAST STN SPEC: NEGATIVE
MYCOBACTERIUM SPEC QL CULT: NEGATIVE
SPECIMEN PREPARATION: NORMAL
SPECIMEN SOURCE: NORMAL